# Patient Record
Sex: FEMALE | Race: OTHER | NOT HISPANIC OR LATINO | URBAN - METROPOLITAN AREA
[De-identification: names, ages, dates, MRNs, and addresses within clinical notes are randomized per-mention and may not be internally consistent; named-entity substitution may affect disease eponyms.]

---

## 2021-04-29 ENCOUNTER — OUTPATIENT (OUTPATIENT)
Dept: OUTPATIENT SERVICES | Facility: HOSPITAL | Age: 26
LOS: 1 days | End: 2021-04-29
Payer: MEDICAID

## 2021-04-29 DIAGNOSIS — R10.0 ACUTE ABDOMEN: ICD-10-CM

## 2021-04-29 DIAGNOSIS — R19.7 DIARRHEA, UNSPECIFIED: ICD-10-CM

## 2021-04-29 DIAGNOSIS — K52.89 OTHER SPECIFIED NONINFECTIVE GASTROENTERITIS AND COLITIS: ICD-10-CM

## 2021-04-29 LAB — HCG UR QL: NEGATIVE — SIGNIFICANT CHANGE UP

## 2021-04-29 PROCEDURE — 88305 TISSUE EXAM BY PATHOLOGIST: CPT

## 2021-04-29 PROCEDURE — 88305 TISSUE EXAM BY PATHOLOGIST: CPT | Mod: 26

## 2021-04-29 PROCEDURE — 45380 COLONOSCOPY AND BIOPSY: CPT

## 2021-04-29 PROCEDURE — 81025 URINE PREGNANCY TEST: CPT

## 2021-04-29 NOTE — CHART NOTE - NSCHARTNOTEFT_GEN_A_CORE
COLONOSCOPY    Scope #: [id number]  Start time: 08.04  Cecum time: 08.10  End time: 08.19  Withdrawal time: 9 min    -Informed consent obtained from patient prior to exam.     INDICATION: Colitis on CT scan. Lower abd pain    ANESTHESIA provided by: Dr. Sabino iHlario    RECTUM:  -Mild colitis  -Multiple biopsies taken to r/o, among other things, HSV and CMV    RECTOSIGMOID (up to 15 cm):  -Mild patchy colitis  -Multiple biopsies taken to r/o, among other things, HSV and CMV    SIGMOID:  -Normal  -Multiple biopsies taken    DESCENDING COLON:  -Normal  -Multiple biopsies taken    TRANSVERSE COLON:  -Normal  -Multiple biopsies taken    ASCENDING COLON:  -Normal  -Multiple biopsies taken    CECUM:  -Normal  -Multiple biopsies taken    TERMINAL ILEUM:  -Normal  -Multiple biopsies taken    The patient tolerated the procedure well.    FINDINGS:  -Mild rectosigmoid and rectal colitis    PLAN:  1) Await pathology results  2) Follow up in the office in 2 weeks

## 2021-05-03 LAB — SURGICAL PATHOLOGY STUDY: SIGNIFICANT CHANGE UP
